# Patient Record
(demographics unavailable — no encounter records)

---

## 2024-10-09 NOTE — HISTORY OF PRESENT ILLNESS
[FreeTextEntry1] : Patient returns for follow-up and discussion.  She has a history of benign pancreatic cyst and surveillance MRI will be suggested in 2 years.  Patient also has rare dysphagia for solids.  Previous endoscopy was negative but I will order esophagram to better evaluate.  Patient also request refill on H2 blocker.

## 2024-10-09 NOTE — ASSESSMENT
[FreeTextEntry1] : Impression and plan Patient came to the office today for follow-up and discussion.  She has had recent MRI for pancreatic cyst.  There is a family history of pancreatic cancer in her sister.  We will continue to monitor with sequential MRI scanning.  Patient also has occasional dysphagia for solids despite negative recent endoscopy.  I will recommend esophagram as neck step in the workup.  Continue on H2 blocker twice daily dosing for now.  Call back when esophagram is complete.  Patient is also in need of surveillance colonoscopy last examination was 5 years ago.  I will schedule both upper endoscopy and colonoscopy to be performed in near future pending results of esophagram.  Risks benefits alternatives and limitation were discussed.

## 2024-10-09 NOTE — PHYSICAL EXAM
[Alert] : alert [Normal Voice/Communication] : normal voice/communication [Healthy Appearing] : healthy appearing [No Acute Distress] : no acute distress [Sclera] : the sclera and conjunctiva were normal [Hearing Threshold Finger Rub Not [place]] : hearing was normal [Normal Lips/Gums] : the lips and gums were normal [Oropharynx] : the oropharynx was normal [Normal Appearance] : the appearance of the neck was normal [No Neck Mass] : no neck mass was observed [No Respiratory Distress] : no respiratory distress [No Acc Muscle Use] : no accessory muscle use [Respiration, Rhythm And Depth] : normal respiratory rhythm and effort [Auscultation Breath Sounds / Voice Sounds] : lungs were clear to auscultation bilaterally [Heart Rate And Rhythm] : heart rate was normal and rhythm regular [Normal S1, S2] : normal S1 and S2 [Murmurs] : no murmurs [Bowel Sounds] : normal bowel sounds [Abdomen Tenderness] : non-tender [No Masses] : no abdominal mass palpated [Abdomen Soft] : soft [] : no hepatosplenomegaly [Oriented To Time, Place, And Person] : oriented to person, place, and time